# Patient Record
Sex: FEMALE | Race: WHITE | Employment: FULL TIME | URBAN - NONMETROPOLITAN AREA
[De-identification: names, ages, dates, MRNs, and addresses within clinical notes are randomized per-mention and may not be internally consistent; named-entity substitution may affect disease eponyms.]

---

## 2023-08-11 ENCOUNTER — HOSPITAL ENCOUNTER (OUTPATIENT)
Dept: CT IMAGING | Age: 58
Discharge: HOME | End: 2023-08-11
Payer: COMMERCIAL

## 2023-08-11 DIAGNOSIS — J18.9 UNRESOLVED PNEUMONIA: ICD-10-CM

## 2023-08-11 PROCEDURE — 71250 CT THORAX DX C-: CPT

## 2024-02-24 ENCOUNTER — OFFICE VISIT (OUTPATIENT)
Dept: URGENT CARE | Facility: CLINIC | Age: 59
End: 2024-02-24
Payer: COMMERCIAL

## 2024-02-24 ENCOUNTER — HOSPITAL ENCOUNTER (OUTPATIENT)
Dept: RADIOLOGY | Facility: CLINIC | Age: 59
Discharge: HOME | End: 2024-02-24
Payer: COMMERCIAL

## 2024-02-24 VITALS
RESPIRATION RATE: 16 BRPM | HEART RATE: 94 BPM | WEIGHT: 130 LBS | BODY MASS INDEX: 20.4 KG/M2 | SYSTOLIC BLOOD PRESSURE: 107 MMHG | OXYGEN SATURATION: 98 % | HEIGHT: 67 IN | DIASTOLIC BLOOD PRESSURE: 76 MMHG | TEMPERATURE: 100.2 F

## 2024-02-24 DIAGNOSIS — R07.81 CHEST PAIN, PLEURITIC: ICD-10-CM

## 2024-02-24 DIAGNOSIS — J18.9 COMMUNITY ACQUIRED PNEUMONIA OF RIGHT LOWER LOBE OF LUNG: Primary | ICD-10-CM

## 2024-02-24 LAB
POC RSV RAPID ANTIGEN: NEGATIVE
POC TRIPLEX FLU A-AG: NORMAL
POC TRIPLEX FLU B-AG: NORMAL
POC TRIPLEX SARSCOV-2 AG: NORMAL

## 2024-02-24 PROCEDURE — 71046 X-RAY EXAM CHEST 2 VIEWS: CPT

## 2024-02-24 PROCEDURE — 99212 OFFICE O/P EST SF 10 MIN: CPT | Performed by: PHYSICIAN ASSISTANT

## 2024-02-24 PROCEDURE — 87428 SARSCOV & INF VIR A&B AG IA: CPT | Performed by: PHYSICIAN ASSISTANT

## 2024-02-24 PROCEDURE — 71046 X-RAY EXAM CHEST 2 VIEWS: CPT | Performed by: RADIOLOGY

## 2024-02-24 RX ORDER — AZITHROMYCIN 250 MG/1
TABLET, FILM COATED ORAL
Qty: 6 TABLET | Refills: 0 | Status: SHIPPED | OUTPATIENT
Start: 2024-02-24 | End: 2024-02-29

## 2024-02-24 RX ORDER — ALBUTEROL SULFATE 90 UG/1
2 AEROSOL, METERED RESPIRATORY (INHALATION) EVERY 6 HOURS PRN
Qty: 18 G | Refills: 0 | Status: SHIPPED | OUTPATIENT
Start: 2024-02-24 | End: 2025-02-23

## 2024-02-24 NOTE — PROGRESS NOTES
[facility] URGENT CARE FRANDY NOTE:      Name: Janet Ornelas, 58 y.o.    CSN:[id number]   MRN:[id number]    PCP: Haile Meyer, DO    ALL:    Allergies   Allergen Reactions    Covid-19 Vacc,Mrna(Pfizer)(Pf) Other    Influenza Virus Vaccines Unknown    Prochlorperazine Seizure and Unknown       History:    Chief Complaint: Fever (FEVER, ELEVATED HEART RATE, RIGHT SIDED CHEST DISCOMFORT. PT STATES PAST HX OF DEVELOPING PNEUMONIA. )    Encounter Date: 2/24/2024  14:25hrs    HPI: The history was obtained from the patient. Janet is a 58 y.o. female, who presents with a chief complaint of Fever (FEVER, ELEVATED HEART RATE, RIGHT SIDED CHEST DISCOMFORT. PT STATES PAST HX OF DEVELOPING PNEUMONIA. )     Since the alleged COVID-vaccine provided to her last year she has had recurrent pneumonias, has been doing well for the last 6 months but recently after exposure to pneumonia by her  who was admitted to the hospital, and her grandchildren who have been having viral illnesses she is now is presenting with right pleuritic-like chest discomfort, worse with deep inspiration, denies any hemoptysis, she denies any productive sputum but does note that there is a low-grade temperature ongoing since Wednesday of last week.  She denies any exertional dyspnea but does note that her heart rate goes up and her fever rises.    PMHx:    Past Medical History:   Diagnosis Date    H/O recurrent pneumonia               Current Outpatient Medications   Medication Sig Dispense Refill    albuterol 90 mcg/actuation inhaler Inhale 2 puffs every 6 hours if needed for wheezing. 18 g 0    azithromycin (Zithromax) 250 mg tablet Take 2 tablets (500 mg) on  Day 1,  followed by 1 tablet (250 mg) once daily on Days 2 through 5. 6 tablet 0     No current facility-administered medications for this visit.         PMSx:    Past Surgical History:   Procedure Laterality Date    OTHER SURGICAL HISTORY  01/12/2022    Complete colonoscopy     OTHER SURGICAL HISTORY  01/12/2022    Appendectomy       Fam Hx: No family history on file.    SOC. Hx:     Social History     Socioeconomic History    Marital status:      Spouse name: Not on file    Number of children: Not on file    Years of education: Not on file    Highest education level: Not on file   Occupational History    Not on file   Tobacco Use    Smoking status: Not on file    Smokeless tobacco: Not on file   Substance and Sexual Activity    Alcohol use: Not on file    Drug use: Not on file    Sexual activity: Not on file   Other Topics Concern    Not on file   Social History Narrative    Not on file     Social Determinants of Health     Financial Resource Strain: Not on file   Food Insecurity: Not on file   Transportation Needs: Not on file   Physical Activity: Not on file   Stress: Not on file   Social Connections: Not on file   Intimate Partner Violence: Not on file   Housing Stability: Not on file         Vitals:    02/24/24 1401   BP: 107/76   Pulse: 94   Resp: 16   Temp: 37.9 °C (100.2 °F)   SpO2: 98%     59 kg (130 lb)          Physical Exam  Constitutional:       Appearance: Normal appearance. She is normal weight.   HENT:      Head: Normocephalic and atraumatic.      Right Ear: Hearing, tympanic membrane, ear canal and external ear normal.      Left Ear: Hearing, tympanic membrane and ear canal normal.      Nose: Nose normal. No congestion.      Mouth/Throat:      Lips: Pink.      Mouth: Mucous membranes are moist.   Eyes:      Extraocular Movements: Extraocular movements intact.   Cardiovascular:      Rate and Rhythm: Normal rate and regular rhythm.   Pulmonary:      Effort: Pulmonary effort is normal. No tachypnea, accessory muscle usage or retractions.      Breath sounds: Normal breath sounds. No decreased breath sounds, wheezing or rhonchi.   Abdominal:      General: Abdomen is flat.   Musculoskeletal:         General: Normal range of motion.      Cervical back: Normal range of  motion and neck supple.   Skin:     General: Skin is warm.   Neurological:      Mental Status: She is alert and oriented to person, place, and time.   Psychiatric:         Behavior: Behavior normal.         LABORATORY @ RADIOLOGICAL IMAGING (if done):     Results for orders placed or performed in visit on 02/24/24 (from the past 24 hour(s))   POCT BD Veritor Triplex Ag   Result Value Ref Range    POC Triplex SARS-CoV-2 Ag  Presumptive negative for Triplex SARS-CoV-2 (no antigen detected)     Presumptive negative for Triplex SARS-CoV-2 (no antigen detected)    POC Triplex Flu A-Ag  Presumptive negative for Triplex FLU A (no antigen detected)     Presumptive negative for Triplex FLU A (no antigen detected)    POC Triplex Flu B-Ag  Presumptive negative for Triplex FLU B (no antigen detected)     Presumptive negative for Triplex FLU B (no antigen detected)   POCT respiratory syncytial virus manually resulted   Result Value Ref Range    RSV Rapid Ag Negative Negative     Chest x-ray pursued given complaints of right-sided pleuritic like chest pain, results below.  FINDINGS:  The heart is of normal size and contour. The pulmonary vessels are  within normal limits. There is persistent lung hyperinflation. There  is mild chronic interstitial prominence. There is focal infiltrate in  the right lung base abutting the right hemidiaphragm. The left lung  is clear. There is no pneumothorax. The osseous structures are  grossly intact.      IMPRESSION:  Focal airspace infiltrate in the right lower lobe. Infectious  pneumonitis is of primary concern in this clinical setting. Continued  follow-up is recommended to resolution.      Signed by: Caleb Cristobal 2/24/2024 3:16 PM  Dictation workstation:   [id number]   COURSE/MEDICAL DECISION MAKING:    Janet is a 58 y.o., who presents with a working diagnosis of   1. Community acquired pneumonia of right lower lobe of lung    2. Chest pain, pleuritic     with a differential to  include: Influenza, parainfluenza, rhinovirus, adenovirus, metapneumovirus, coronavirus, COVID-19, postnasal drip, strep pharyngitis, GERD, retropharyngeal abscess, tonsillitis, adenitis, seasonal allergies    Treat with Zithromax, if persistent she may have refill and/or maintenance meds,  Encourage follow-through appointment with pulmonologist when available or nurse practitioner.          Benjamín Little PA-C   Advanced Practice Provider  [facility] URG[facility]

## 2024-02-26 DIAGNOSIS — J18.9 COMMUNITY ACQUIRED PNEUMONIA OF RIGHT LOWER LOBE OF LUNG: Primary | ICD-10-CM

## 2024-02-26 RX ORDER — LEVOFLOXACIN 750 MG/1
750 TABLET ORAL DAILY
Qty: 5 TABLET | Refills: 0 | Status: SHIPPED | OUTPATIENT
Start: 2024-02-26 | End: 2024-02-28

## 2024-02-26 NOTE — PROGRESS NOTES
[facility] URGENT CARE Telephone NOTE:    Name: Janet Ornelas, 58 y.o.    CSN:[id number]   MRN:[id number]    PCP: Haile Meyer, DO  ALL:    Allergies   Allergen Reactions    Covid-19 Vacc,Mrna(Pfizer)(Pf) Other    Influenza Virus Vaccines Unknown    Prochlorperazine Seizure and Unknown         Date of call: 02/26/24  Time of Call: 9:56 AM    Connection was: MADE    Spoke with: Patient      Purpose:  WANTS THE IMAGES SENT TO DR. PAUL DEL RIO      Details:   Diagnostic tests were reviewed and questions answered. Diagnosis, care plan and treatment options were discussed. The patient understand instructions and will follow up as directed.

## 2024-02-28 RX ORDER — LEVOFLOXACIN 750 MG/1
750 TABLET ORAL DAILY
Qty: 7 TABLET | Refills: 0 | Status: SHIPPED | OUTPATIENT
Start: 2024-02-28 | End: 2024-03-06